# Patient Record
Sex: MALE | Race: WHITE
[De-identification: names, ages, dates, MRNs, and addresses within clinical notes are randomized per-mention and may not be internally consistent; named-entity substitution may affect disease eponyms.]

---

## 2019-10-16 ENCOUNTER — HOSPITAL ENCOUNTER (OUTPATIENT)
Dept: HOSPITAL 95 - PLD | Age: 74
Discharge: HOME | End: 2019-10-16
Attending: DERMATOLOGY
Payer: MEDICARE

## 2019-10-16 DIAGNOSIS — D48.5: Primary | ICD-10-CM

## 2020-02-02 ENCOUNTER — HOSPITAL ENCOUNTER (INPATIENT)
Dept: HOSPITAL 95 - ER | Age: 75
LOS: 6 days | Discharge: ANOTHER HOSPITAL | DRG: 871 | End: 2020-02-08
Attending: HOSPITALIST | Admitting: HOSPITALIST
Payer: OTHER GOVERNMENT

## 2020-02-02 VITALS — WEIGHT: 209.99 LBS | BODY MASS INDEX: 30.06 KG/M2 | HEIGHT: 70 IN

## 2020-02-02 DIAGNOSIS — E86.0: ICD-10-CM

## 2020-02-02 DIAGNOSIS — E78.5: ICD-10-CM

## 2020-02-02 DIAGNOSIS — C78.00: ICD-10-CM

## 2020-02-02 DIAGNOSIS — A40.1: Primary | ICD-10-CM

## 2020-02-02 DIAGNOSIS — C43.9: ICD-10-CM

## 2020-02-02 DIAGNOSIS — G06.2: ICD-10-CM

## 2020-02-02 DIAGNOSIS — E87.1: ICD-10-CM

## 2020-02-02 DIAGNOSIS — M54.9: ICD-10-CM

## 2020-02-02 DIAGNOSIS — R13.10: ICD-10-CM

## 2020-02-02 DIAGNOSIS — K59.00: ICD-10-CM

## 2020-02-02 DIAGNOSIS — G89.29: ICD-10-CM

## 2020-02-02 DIAGNOSIS — N18.4: ICD-10-CM

## 2020-02-02 DIAGNOSIS — J98.11: ICD-10-CM

## 2020-02-02 DIAGNOSIS — Z87.891: ICD-10-CM

## 2020-02-02 DIAGNOSIS — I12.9: ICD-10-CM

## 2020-02-02 DIAGNOSIS — C78.7: ICD-10-CM

## 2020-02-02 DIAGNOSIS — N17.9: ICD-10-CM

## 2020-02-02 DIAGNOSIS — M75.102: ICD-10-CM

## 2020-02-02 DIAGNOSIS — M50.30: ICD-10-CM

## 2020-02-02 DIAGNOSIS — N39.0: ICD-10-CM

## 2020-02-02 LAB
ALBUMIN SERPL BCP-MCNC: 3.9 G/DL (ref 3.4–5)
ALBUMIN/GLOB SERPL: 0.7 {RATIO} (ref 0.8–1.8)
ALT SERPL W P-5'-P-CCNC: 63 U/L (ref 12–78)
ANION GAP SERPL CALCULATED.4IONS-SCNC: 9 MMOL/L (ref 6–16)
AST SERPL W P-5'-P-CCNC: 108 U/L (ref 12–37)
BASOPHILS # BLD AUTO: 0.04 K/MM3 (ref 0–0.23)
BASOPHILS NFR BLD AUTO: 0 % (ref 0–2)
BILIRUB SERPL-MCNC: 0.6 MG/DL (ref 0.1–1)
BUN SERPL-MCNC: 54 MG/DL (ref 8–24)
CALCIUM SERPL-MCNC: 9 MG/DL (ref 8.5–10.1)
CHLORIDE SERPL-SCNC: 104 MMOL/L (ref 98–108)
CO2 SERPL-SCNC: 22 MMOL/L (ref 21–32)
CREAT SERPL-MCNC: 3.83 MG/DL (ref 0.6–1.2)
CREAT UR-MCNC: 238 MG/DL (ref 27–270)
DEPRECATED RDW RBC AUTO: 56.8 FL (ref 35.1–46.3)
EOSINOPHIL # BLD AUTO: 0.03 K/MM3 (ref 0–0.68)
EOSINOPHIL NFR BLD AUTO: 0 % (ref 0–6)
ERYTHROCYTE [DISTWIDTH] IN BLOOD BY AUTOMATED COUNT: 15.6 % (ref 11.7–14.2)
GLOBULIN SER CALC-MCNC: 5.4 G/DL (ref 2.2–4)
GLUCOSE SERPL-MCNC: 113 MG/DL (ref 70–99)
HCT VFR BLD AUTO: 33.7 % (ref 37–53)
HGB BLD-MCNC: 11.2 G/DL (ref 13.5–17.5)
IMM GRANULOCYTES # BLD AUTO: 0.27 K/MM3 (ref 0–0.1)
IMM GRANULOCYTES NFR BLD AUTO: 1 % (ref 0–1)
LEUKOCYTE ESTERASE UR QL STRIP: (no result)
LYMPHOCYTES # BLD AUTO: 0.34 K/MM3 (ref 0.84–5.2)
LYMPHOCYTES NFR BLD AUTO: 2 % (ref 21–46)
MCHC RBC AUTO-ENTMCNC: 33.2 G/DL (ref 31.5–36.5)
MCV RBC AUTO: 98 FL (ref 80–100)
MONOCYTES # BLD AUTO: 1.05 K/MM3 (ref 0.16–1.47)
MONOCYTES NFR BLD AUTO: 5 % (ref 4–13)
NEUTROPHILS # BLD AUTO: 19.68 K/MM3 (ref 1.96–9.15)
NEUTROPHILS NFR BLD AUTO: 92 % (ref 41–73)
NRBC # BLD AUTO: 0 K/MM3 (ref 0–0.02)
NRBC BLD AUTO-RTO: 0 /100 WBC (ref 0–0.2)
PLATELET # BLD AUTO: 181 K/MM3 (ref 150–400)
POTASSIUM SERPL-SCNC: 4.4 MMOL/L (ref 3.5–5.5)
PROT SERPL-MCNC: 9.3 G/DL (ref 6.4–8.2)
PROT UR STRIP-MCNC: (no result) MG/DL
RBC #/AREA URNS HPF: (no result) /HPF (ref 0–2)
SODIUM SERPL-SCNC: 135 MMOL/L (ref 136–145)
SODIUM UR-SCNC: 21 MMOL/L (ref 20–110)
SP GR SPEC: 1.02 (ref 1–1.02)
UROBILINOGEN UR STRIP-MCNC: (no result) MG/DL

## 2020-02-02 PROCEDURE — A9270 NON-COVERED ITEM OR SERVICE: HCPCS

## 2020-02-03 LAB
ANION GAP SERPL CALCULATED.4IONS-SCNC: 7 MMOL/L (ref 6–16)
BUN SERPL-MCNC: 49 MG/DL (ref 8–24)
CALCIUM SERPL-MCNC: 8.4 MG/DL (ref 8.5–10.1)
CHLORIDE SERPL-SCNC: 108 MMOL/L (ref 98–108)
CO2 SERPL-SCNC: 23 MMOL/L (ref 21–32)
CREAT SERPL-MCNC: 3.16 MG/DL (ref 0.6–1.2)
DEPRECATED RDW RBC AUTO: 56.5 FL (ref 35.1–46.3)
ERYTHROCYTE [DISTWIDTH] IN BLOOD BY AUTOMATED COUNT: 15.7 % (ref 11.7–14.2)
GLUCOSE SERPL-MCNC: 97 MG/DL (ref 70–99)
HCT VFR BLD AUTO: 27.9 % (ref 37–53)
HGB BLD-MCNC: 9.4 G/DL (ref 13.5–17.5)
MCHC RBC AUTO-ENTMCNC: 33.7 G/DL (ref 31.5–36.5)
MCV RBC AUTO: 98 FL (ref 80–100)
NRBC # BLD AUTO: 0 K/MM3 (ref 0–0.02)
NRBC BLD AUTO-RTO: 0 /100 WBC (ref 0–0.2)
PLATELET # BLD AUTO: 151 K/MM3 (ref 150–400)
POTASSIUM SERPL-SCNC: 4.2 MMOL/L (ref 3.5–5.5)
SODIUM SERPL-SCNC: 138 MMOL/L (ref 136–145)

## 2020-02-03 NOTE — NUR
RECEIVED CALL FROM LAB STATING BLOOD CULTURES POSITIVE FOR GRAM + COCCI IN
CHAINS. CALL PLACED TO LAB TO VERIFY ADEQUATE ANTIBIOTIC COVERAGE. SPOKE WITH
PHARMACIST ANTONIO WHO STATED ROCEPHIN IS SUFFICIENT FOR NOW UNTIL MD'S REVIEW
IN THE AM.

## 2020-02-03 NOTE — NUR
SHIFT SUMMARY:
FEBRILE TEMP DOWN .4 FROM 102 AFTER TYLENOL, ICE WATER, AND ICE PACK TO
NECK. REPORTS LESS TREMORING BUT CONTINUED GENERALIZED WEAKNESS. HAVING
DIFFICULTY BRINGING WATER TO MOUTH AND MANAGING PLACEMENT OF URINAL
INDEPENDENTLY. PAIN TO BACK, NECK, AND KNEE RESPONDING WELL TO PRN FENTANYL.
WIFE STATES PT'S VERBALIZATIONS ARE SOMETIMES NON-SENSICAL- NOT HIS BASELINE.
WILL CONT TO MONITOR.

## 2020-02-03 NOTE — NUR
SHIFT SUMMARY-
PT ALERT AND ORIENTED TO SELF AND FAMILY. PT OCCASSIONALLY SPEAKS IN
NON-SENSICAL SPEACH PATTERNS. PT SAT AT THE EOB WITH OT AND RN THEN HE TIPPED
TO THE LEFT AND SAID HE WILL STAND UP WHEN HE WAS READY BUT HE "NEEDED TO TAKE
HIS TIME AND GO SLOW." PT REMAINED IN THAT POSITION FOR THE NEXT SEVERAL
MINUTES SAYING "YOU SEE I FEEL LIKE I SHOULD BE ABLE TO CONTROL MYSELF BUT I
DON'T HAVE CONTROL." PT SEEMED TO BE HAVING LEFT SIDED WEAKNESS INCLUDING THE
TRUNK MUSCLES. PT HAS HAD PAIN T/O THE DAY. HOME DOSE OF GABAPENTIN AND
TRAMADOL WERE ORDERED AND DOSE WAS ADJUSTED FOR THE PT RENAL FUNCTION. ICE HAS
BEEN APPLIED TO THE PT LOWER BACK SEVERAL TIMES AND IT SEEMS TO BE HELPING. PT
HAS RUN A LOW GRADE TEMP THAT WAS REDUCED .1 WITH TYLENOL AND BLANKET
REMOVAL.
 
SPOKE TO DR HAYWARD ABOUT SOME BRUISES ON THE PT INNER THIGH THAT WERE NOT
NOTED ON THE ASSESSMENT THIS MORNING, THE BRUISES ARE FAINT AND THE PT SPOUSE
DENIES ANY FALL AT HOME. A SMALL BRUISE LATER NOTED ON THE OUTSIDE OF THE PT
RIGHT THIGH AS WELL. PER DR YOUNG BRUISES (LIGHT IN COLOR BUT VISIBLE)
WERE OUTLINED IN MARKER SO THAT STAFF CAN MONITOR THEM FOR CHANGE IN SIZE OR
COLOR.
 
PT SPOUSE IS VERY INVOLVED IN HIS CARE AND HAS ACCESS TO ALL OF HIS CLINICAL
TRIAL INFORMATION AS WELL AS HIS [facility] MEDICAL RECORDS. PT CURRENTLY ADMITTED
FOR A UTI. SPOUSE ASKS QUESTIONS AND ASSISTS THE PT WITH THE URINAL. SPOUSE IS
AT THE BEDSIDE ROUND THE CLOCK.

## 2020-02-04 LAB
ALBUMIN SERPL BCP-MCNC: 3.5 G/DL (ref 3.4–5)
ANION GAP SERPL CALCULATED.4IONS-SCNC: 5 MMOL/L (ref 6–16)
BASOPHILS # BLD AUTO: 0.05 K/MM3 (ref 0–0.23)
BASOPHILS NFR BLD AUTO: 0 % (ref 0–2)
BUN SERPL-MCNC: 34 MG/DL (ref 8–24)
CALCIUM SERPL-MCNC: 9 MG/DL (ref 8.5–10.1)
CHLORIDE SERPL-SCNC: 109 MMOL/L (ref 98–108)
CO2 SERPL-SCNC: 24 MMOL/L (ref 21–32)
CREAT SERPL-MCNC: 2.34 MG/DL (ref 0.6–1.2)
DEPRECATED RDW RBC AUTO: 56.8 FL (ref 35.1–46.3)
EOSINOPHIL # BLD AUTO: 0.06 K/MM3 (ref 0–0.68)
EOSINOPHIL NFR BLD AUTO: 1 % (ref 0–6)
ERYTHROCYTE [DISTWIDTH] IN BLOOD BY AUTOMATED COUNT: 15.6 % (ref 11.7–14.2)
GLUCOSE SERPL-MCNC: 92 MG/DL (ref 70–99)
HCT VFR BLD AUTO: 31.2 % (ref 37–53)
HGB BLD-MCNC: 10.1 G/DL (ref 13.5–17.5)
IMM GRANULOCYTES # BLD AUTO: 0.04 K/MM3 (ref 0–0.1)
IMM GRANULOCYTES NFR BLD AUTO: 0 % (ref 0–1)
LYMPHOCYTES # BLD AUTO: 0.87 K/MM3 (ref 0.84–5.2)
LYMPHOCYTES NFR BLD AUTO: 7 % (ref 21–46)
MCHC RBC AUTO-ENTMCNC: 32.4 G/DL (ref 31.5–36.5)
MCV RBC AUTO: 98 FL (ref 80–100)
MONOCYTES # BLD AUTO: 0.95 K/MM3 (ref 0.16–1.47)
MONOCYTES NFR BLD AUTO: 8 % (ref 4–13)
NEUTROPHILS # BLD AUTO: 10.39 K/MM3 (ref 1.96–9.15)
NEUTROPHILS NFR BLD AUTO: 84 % (ref 41–73)
NRBC # BLD AUTO: 0 K/MM3 (ref 0–0.02)
NRBC BLD AUTO-RTO: 0 /100 WBC (ref 0–0.2)
PHOSPHATE SERPL-MCNC: 2.4 MG/DL (ref 2.5–4.9)
PLATELET # BLD AUTO: 167 K/MM3 (ref 150–400)
POTASSIUM SERPL-SCNC: 3.9 MMOL/L (ref 3.5–5.5)
SODIUM SERPL-SCNC: 138 MMOL/L (ref 136–145)
VANCOMYCIN SERPL-MCNC: 10.2 UG/ML

## 2020-02-04 NOTE — NUR
SUMMARY: NO ACUTE CHANGE TODAY. VSS, PT ORIENTED AT TIMES, TENDS TO BE
FORGETFUL YET PLEASENT. BED ALARM ON FOR SAFETY. PT MOBILITY GETTING BETTER,
ABLE TO GET UP WITH 1 ASSIST TO CHAIR WITH PHYSICAL THERAPY. REPORTED MINIMAL
PAIN, TELE WNL. NO S/SX OF SEPSIS. NO SAFETY CONCERNS AT THIS TIME.

## 2020-02-04 NOTE — NUR
SHIFT SUMMARY
PATIENT RECEIVED TWO DOSES OF PRN PAIN MEDICATION OVERNIGHT ACCORDING TO HIS
EMAR. MEDICATED FOR FEVER AS WELL. ALL PRN MEDICATION GIVEN WAS EFFECTIVE. IV
PATENT AND FLUSHED. BED IN LOWEST POSITION WITH WHEELS LOCKED. CALL LIGHT
WITHIN REACH. WIFE ROOMING IN. REPORT GIVEN TO ONCOMING RN.

## 2020-02-05 LAB
ALBUMIN SERPL BCP-MCNC: 3.2 G/DL (ref 3.4–5)
ANION GAP SERPL CALCULATED.4IONS-SCNC: 7 MMOL/L (ref 6–16)
BUN SERPL-MCNC: 29 MG/DL (ref 8–24)
CALCIUM SERPL-MCNC: 8.5 MG/DL (ref 8.5–10.1)
CHLORIDE SERPL-SCNC: 111 MMOL/L (ref 98–108)
CO2 SERPL-SCNC: 23 MMOL/L (ref 21–32)
CREAT SERPL-MCNC: 1.75 MG/DL (ref 0.6–1.2)
DEPRECATED RDW RBC AUTO: 54.9 FL (ref 35.1–46.3)
ERYTHROCYTE [DISTWIDTH] IN BLOOD BY AUTOMATED COUNT: 15.3 % (ref 11.7–14.2)
GLUCOSE SERPL-MCNC: 110 MG/DL (ref 70–99)
HCT VFR BLD AUTO: 28.8 % (ref 37–53)
HGB BLD-MCNC: 9.4 G/DL (ref 13.5–17.5)
MCHC RBC AUTO-ENTMCNC: 32.6 G/DL (ref 31.5–36.5)
MCV RBC AUTO: 98 FL (ref 80–100)
NRBC # BLD AUTO: 0 K/MM3 (ref 0–0.02)
NRBC BLD AUTO-RTO: 0 /100 WBC (ref 0–0.2)
PHOSPHATE SERPL-MCNC: 2.6 MG/DL (ref 2.5–4.9)
PLATELET # BLD AUTO: 155 K/MM3 (ref 150–400)
POTASSIUM SERPL-SCNC: 3.7 MMOL/L (ref 3.5–5.5)
SODIUM SERPL-SCNC: 141 MMOL/L (ref 136–145)

## 2020-02-05 NOTE — NUR
SHIFT SUMMARY
 
PATIENT MEDICATED SEVRAL TIMES FOR PAIN AND ONCE FOR NAUSEA THIS SHIFT. DENIES
SHORTNESS OF BREATH. PATIENT IS EXREMELY PAINFUL WHEN MOVED. PATIENT WORKED
WITH PT/OT TODAY AND DID WALK IN WINTERS WITH FWW. PATIENT UP IN CHAIR IN THE
MORNING. PATIENT MEDICATED X1 FOR FEVER. FAMILY AT BEDSIDE. CALL LIGHT IN
REACH.

## 2020-02-05 NOTE — NUR
EOS: PATIENT AO4 BUT CONFUSED AT TIMES, BUT WAS EASILY REDIRECTED. HE RECEIVED
PAIN MEDS FOR BACK PAIN PER EMAR. PT WAS PLEASANT AND COOPERATIVE WITH CARE.

## 2020-02-06 LAB
ALBUMIN SERPL BCP-MCNC: 3 G/DL (ref 3.4–5)
ANION GAP SERPL CALCULATED.4IONS-SCNC: 8 MMOL/L (ref 6–16)
BASOPHILS # BLD AUTO: 0.02 K/MM3 (ref 0–0.23)
BASOPHILS NFR BLD AUTO: 0 % (ref 0–2)
BUN SERPL-MCNC: 24 MG/DL (ref 8–24)
CALCIUM SERPL-MCNC: 8.2 MG/DL (ref 8.5–10.1)
CHLORIDE SERPL-SCNC: 109 MMOL/L (ref 98–108)
CO2 SERPL-SCNC: 21 MMOL/L (ref 21–32)
CREAT SERPL-MCNC: 1.62 MG/DL (ref 0.6–1.2)
DEPRECATED RDW RBC AUTO: 54.8 FL (ref 35.1–46.3)
EOSINOPHIL # BLD AUTO: 0.01 K/MM3 (ref 0–0.68)
EOSINOPHIL NFR BLD AUTO: 0 % (ref 0–6)
ERYTHROCYTE [DISTWIDTH] IN BLOOD BY AUTOMATED COUNT: 15.4 % (ref 11.7–14.2)
GLUCOSE SERPL-MCNC: 125 MG/DL (ref 70–99)
HCT VFR BLD AUTO: 26.4 % (ref 37–53)
HGB BLD-MCNC: 8.6 G/DL (ref 13.5–17.5)
IMM GRANULOCYTES # BLD AUTO: 0.07 K/MM3 (ref 0–0.1)
IMM GRANULOCYTES NFR BLD AUTO: 1 % (ref 0–1)
LYMPHOCYTES # BLD AUTO: 0.66 K/MM3 (ref 0.84–5.2)
LYMPHOCYTES NFR BLD AUTO: 6 % (ref 21–46)
MCHC RBC AUTO-ENTMCNC: 32.6 G/DL (ref 31.5–36.5)
MCV RBC AUTO: 97 FL (ref 80–100)
MONOCYTES # BLD AUTO: 1.41 K/MM3 (ref 0.16–1.47)
MONOCYTES NFR BLD AUTO: 12 % (ref 4–13)
NEUTROPHILS # BLD AUTO: 9.2 K/MM3 (ref 1.96–9.15)
NEUTROPHILS NFR BLD AUTO: 81 % (ref 41–73)
NRBC # BLD AUTO: 0 K/MM3 (ref 0–0.02)
NRBC BLD AUTO-RTO: 0 /100 WBC (ref 0–0.2)
PHOSPHATE SERPL-MCNC: 2.4 MG/DL (ref 2.5–4.9)
PLATELET # BLD AUTO: 154 K/MM3 (ref 150–400)
POTASSIUM SERPL-SCNC: 3.8 MMOL/L (ref 3.5–5.5)
SODIUM SERPL-SCNC: 138 MMOL/L (ref 136–145)

## 2020-02-06 NOTE — NUR
PT CHANGE IN CONDITION
THIS PM, PT IS MORE LETHARGIC AND AMS COMPARED TO PREVIOUS NIGHT, SKIN IS
HOT TO TOUCH, AND IS STILL SPIKING FEVERS. PT'S WIFE ALSO NOTED THAT HE SEEMS
TO BE "GOING [place]", AND THAT HE IS "WORSE" THAN HE WAS THE PREVIOUS DAY.
THE PT ALSO APPEARED TO STILL BE IN SEVERE PAIN EVEN AFTER RECEIVING IV PAIN
MEDICATION, GRIMACING AND TENSE. PT'S BP, HR AND RR WERE ALL ELEVATED AT PM
VITALS. PT IS STILL FULL CODE, AS PT'S WIFE STATES THAT SHE IS WAITING FOR
FAMILY TO ARRIVE FROM OUT OF STATE BEFORE SHE MAKES ANY DECISIONS REGARDING
CHANGING CODE STATUS. THE CHARGE NURSE WAS CONSULTED ON PT CONDITION, AND THE
HOSPITALIST NP CARMEN WAS CONSULTED, AND SHE AGREED TO COME LOOK AT THE
PATIENT. AFTER SPEAKING WITH THE PT AND HIS WIFE, IV FENTANYL WAS INCREASED TO
 MCG Q2H, AND PO LIQUID ROXICODONE WAS ADDED. NO OTHER CHANGES IN ORDERS
AT THIS TIME. PT'S BEDTIME PO MEDS WERE HELD, AS PT WAS NOT AWARE ENOUGH TO
SWALLOW SAFELY AND THE PT'S WIFE STATED THAT HE HAD BEEN HAVING TROUBLE
SWALLOWING ICE CHIPS/WATER. WILL CONTINUE TO MONITOR.

## 2020-02-06 NOTE — NUR
SHIFT SUMMARY
PT IS A 75 Y/O MALE, ADMITTED WITH SEPSIS POSSIBLY R/T A UTI, WITH A HX OF
STAGE 4 CA. HE IS A&O X 2-3, THOUGH HAD PERIODS OF INCREASED CONFUSION/AMS AND
HALLUCINATIONS PER THE WIFE, WHO REMAINED AT BEDSIDE. THE PT HAD A DIFFICULT
TIME REMAINING COMFORTABLE. HE WAS MEDICATED WITH TYLENOL X 2 FOR PAIN AND
FEVER, AS WELL AS ULTRAM, GABAPENTIN AND FENTANYL ONCE EACH. PT STILL ONLY
SLEPT FITFULLY ON AND OFF FOR SHORT AMOUNTS OF TIME DURING THE NIGHT. PT
COMPLAINED OF PAIN IN HIS NECK AND BOTH SHOULDERS, COLD PACKS WERE ALSO USED
FOR ATTEMPTED PAIN CONTROL. NO COMPLAINTS OF SOB OR NAUSEA. PT DID HAVE AN
ELEVATED TEMPERATURE, 100.3-101.0, AND BP WAS SLIGHTLY ELEVATED IN THE 160S
SYSTOLICALLY. VITALS OTHERWISE STABLE. TELE MONITOR SHOWED NSR C BBC IN THE
90S. PT RECEIVED NS @ 100 ML/HR THROUGH THE NIGHT. NO OTHER ACUTE CHANGES IN
PT CONDITION NOTED. WILL CONTINUE TO MONITOR AND TREAT PER EMAR UNTIL HAND OFF
TO DAY SHIFT RN.

## 2020-02-06 NOTE — NUR
SHIFT SUMMARY
 
PATIENT MEDICATED FREQUENTLY FOR PAIN TODAY WITH DIFFICULTY CONTROLING HIS
PAIN. PATIENT UP IN RECLINER MOST OF DAY. PATIENT HAS POOR APPETITE. PATIENT
GIVEN TYLENOL X2 FOR FEVER THIS SHIFT. PATIENT DENIES NAUSEA AND SHORTNESS OF
PATIENT CONTINUES TO BE CONFUSED.  FAMILY AT BEDSIDE. CALL LIGHT IN REACH.

## 2020-02-06 NOTE — NUR
Clinical Visit:
 
Spoke with the pt's wife, Angelica. She is at bedside, along with pt's other
family members. She reports that the pt possibly has an advance directive with
the [facility]. He does not have a POLST form. She is aware of the document and the
purpose of the document. She accepts the form and would like to look at it.
 
Pt is resting in the chair. He does not wake up during my visit. Angelica
expresses concern over his condition. Reports that she had made him a FULL
code status on admission to the hospital because she wanted a chance for all
the children to be able to see him before he . She states that "most of
them have already made it." She states that her wish is to have him
resusitated at this time, but knows that it is expected since he is so sick
and has stage IV cancer. Instructed that the pt's code status could be
changed, if that is what she would like, but she declines at this time.
Encouraged to inform nursing when she is ready to make pt DNR/DNI. She has no
other concerns or questions at this time. Will follow up with pt and family
tomorrow.

## 2020-02-07 LAB
ALBUMIN SERPL BCP-MCNC: 2.8 G/DL (ref 3.4–5)
ANION GAP SERPL CALCULATED.4IONS-SCNC: 7 MMOL/L (ref 6–16)
BASOPHILS # BLD AUTO: 0.03 K/MM3 (ref 0–0.23)
BASOPHILS NFR BLD AUTO: 0 % (ref 0–2)
BUN SERPL-MCNC: 25 MG/DL (ref 8–24)
CALCIUM SERPL-MCNC: 8 MG/DL (ref 8.5–10.1)
CHLORIDE SERPL-SCNC: 112 MMOL/L (ref 98–108)
CO2 SERPL-SCNC: 21 MMOL/L (ref 21–32)
CREAT SERPL-MCNC: 1.77 MG/DL (ref 0.6–1.2)
DEPRECATED RDW RBC AUTO: 54.8 FL (ref 35.1–46.3)
EOSINOPHIL # BLD AUTO: 0 K/MM3 (ref 0–0.68)
EOSINOPHIL NFR BLD AUTO: 0 % (ref 0–6)
ERYTHROCYTE [DISTWIDTH] IN BLOOD BY AUTOMATED COUNT: 15.4 % (ref 11.7–14.2)
GLUCOSE SERPL-MCNC: 120 MG/DL (ref 70–99)
HCT VFR BLD AUTO: 26.8 % (ref 37–53)
HGB BLD-MCNC: 9 G/DL (ref 13.5–17.5)
IMM GRANULOCYTES # BLD AUTO: 0.12 K/MM3 (ref 0–0.1)
IMM GRANULOCYTES NFR BLD AUTO: 1 % (ref 0–1)
LYMPHOCYTES # BLD AUTO: 0.67 K/MM3 (ref 0.84–5.2)
LYMPHOCYTES NFR BLD AUTO: 4 % (ref 21–46)
MCHC RBC AUTO-ENTMCNC: 33.6 G/DL (ref 31.5–36.5)
MCV RBC AUTO: 96 FL (ref 80–100)
MONOCYTES # BLD AUTO: 1.18 K/MM3 (ref 0.16–1.47)
MONOCYTES NFR BLD AUTO: 7 % (ref 4–13)
NEUTROPHILS # BLD AUTO: 13.94 K/MM3 (ref 1.96–9.15)
NEUTROPHILS NFR BLD AUTO: 87 % (ref 41–73)
NRBC # BLD AUTO: 0 K/MM3 (ref 0–0.02)
NRBC BLD AUTO-RTO: 0 /100 WBC (ref 0–0.2)
PHOSPHATE SERPL-MCNC: 2.6 MG/DL (ref 2.5–4.9)
PLATELET # BLD AUTO: 191 K/MM3 (ref 150–400)
POTASSIUM SERPL-SCNC: 3.9 MMOL/L (ref 3.5–5.5)
SODIUM SERPL-SCNC: 140 MMOL/L (ref 136–145)

## 2020-02-07 NOTE — NUR
SHIFT SUMMARY
 
PATIENT UP ONE ASSIST W/FWW TO BATHROOM TODAY. UP IN RECLINER THIS SHIFT.
PATIENT CONFUSED BUT ALERT IN THE MORNING, SLEEPING MOST OF AFTERNOON. FAMILY
AT BEDSIDE. PATIENT HAD XRAY OF NECK TODAY. ANTICIPATING SNF DISCHARGE
TOMORROW. CALL LIGHT IN REACH.

## 2020-02-07 NOTE — NUR
SHIFT SUMMARY
PT IS A 75 Y/O MALE, ADMITTED FOR SEPSIS. PT HAS A HX OF STAGE 4 CA. PT IS
MORE CONFUSED/AMS TONIGHT COMPARED TO PREVIOUS NIGHT SHIFT, A&O X 1-2, AND WAS
MORE LETHARGIC COMPARED TO PREVIOUS NIGHT (SEE PREVIOUS NOTE). PT SPIKED
FEVERS DURING THE NIGHT, UP .9 AXILLARY. PT WAS HAVING DIFFICULTY
SWALLOWING DURING THE NIGHT, PER WIFE PT WAS CHOKING SLIGHTLY ON ICE CHIPS. PM
MEDS HELD. AFTER CONSULTING HOSPITALIST DR VALADEZ, PO TYLENOL WAS CHANGED
TO ME. PT'S TEMPERATURE CAME DOWN FROM 102.8 TO 98.6 ON SECOND RECHECK. BP, HR
AND RR WERE ALL ELEVATED. PER TELE MONITOR, PT RAN NSR-ST IN THE . PT
WAS MEDICATED WITH FENTANYL AND PO ROXICODONE FOR PAIN, WHICH WORKED WELL IN
COMBINATION TO CONTROL PAIN. WIFE REMAINED IN THE ROOM AT BEDSIDE. NO OTHER
ACUTE CHANGES IN PT CONDITION NOTED. WILL CONTINUE TO MONITOR AND TREAT PER
EMAR UNTIL HAND OFF TO DAY SHIFT RN.

## 2020-02-07 NOTE — NUR
Clinical Visit:
 
Pt is resting in his recliner at time of visit. He occassionally grimmaces in
his sleep. Wife, Angelica, is at bedside and states that he has been grimmacing
like that, but it is not frequent. She has been watching him and states that
she would request pain medication for him if she believed he was in pain.
Angelica reports that all of the family has now made it to come see him, they
have seven children together and many grandchildren and great grandchildren.
Listened as Angelica shared their 40 year marriage history. She says that he is a
very kind man and has been very good to her. She is tearful as she talks about
him. She reports that she knows that he will come to the end of his life. She
expresses concern over living without him after he passes. Therapeutic time
spent with her. Encouraged self care. She has been spending the night with the
pt here at the hospital, but she states that with so much family here in town,
she may ask one of them to stay with him while she goes home to rest.
 
Updated nurse, Gabriella. Will follow up with this pt tomorrow.

## 2020-02-08 LAB
ANION GAP SERPL CALCULATED.4IONS-SCNC: 7 MMOL/L (ref 6–16)
BASOPHILS # BLD AUTO: 0.04 K/MM3 (ref 0–0.23)
BASOPHILS NFR BLD AUTO: 0 % (ref 0–2)
BUN SERPL-MCNC: 34 MG/DL (ref 8–24)
CALCIUM SERPL-MCNC: 7.8 MG/DL (ref 8.5–10.1)
CHLORIDE SERPL-SCNC: 112 MMOL/L (ref 98–108)
CO2 SERPL-SCNC: 22 MMOL/L (ref 21–32)
CREAT SERPL-MCNC: 1.95 MG/DL (ref 0.6–1.2)
DEPRECATED RDW RBC AUTO: 58.4 FL (ref 35.1–46.3)
EOSINOPHIL # BLD AUTO: 0.02 K/MM3 (ref 0–0.68)
EOSINOPHIL NFR BLD AUTO: 0 % (ref 0–6)
ERYTHROCYTE [DISTWIDTH] IN BLOOD BY AUTOMATED COUNT: 15.9 % (ref 11.7–14.2)
GLUCOSE SERPL-MCNC: 169 MG/DL (ref 70–99)
HCT VFR BLD AUTO: 27 % (ref 37–53)
HGB BLD-MCNC: 8.7 G/DL (ref 13.5–17.5)
IMM GRANULOCYTES # BLD AUTO: 0.09 K/MM3 (ref 0–0.1)
IMM GRANULOCYTES NFR BLD AUTO: 1 % (ref 0–1)
LYMPHOCYTES # BLD AUTO: 0.7 K/MM3 (ref 0.84–5.2)
LYMPHOCYTES NFR BLD AUTO: 5 % (ref 21–46)
MCHC RBC AUTO-ENTMCNC: 32.2 G/DL (ref 31.5–36.5)
MCV RBC AUTO: 100 FL (ref 80–100)
MONOCYTES # BLD AUTO: 0.97 K/MM3 (ref 0.16–1.47)
MONOCYTES NFR BLD AUTO: 6 % (ref 4–13)
NEUTROPHILS # BLD AUTO: 13.3 K/MM3 (ref 1.96–9.15)
NEUTROPHILS NFR BLD AUTO: 88 % (ref 41–73)
NRBC # BLD AUTO: 0 K/MM3 (ref 0–0.02)
NRBC BLD AUTO-RTO: 0 /100 WBC (ref 0–0.2)
PLATELET # BLD AUTO: 229 K/MM3 (ref 150–400)
POTASSIUM SERPL-SCNC: 3.7 MMOL/L (ref 3.5–5.5)
SODIUM SERPL-SCNC: 141 MMOL/L (ref 136–145)

## 2020-02-08 NOTE — NUR
DISCHARGE
 
PT IS [facility] TRANSFER [facility], GOING TO ROOM 7112.
ATTEMPTED TO CALL REPORT TO SHARITA CARY, SHE WAS NOT AVAILABLE, SHARITA RETURN CALL.